# Patient Record
Sex: MALE | Race: OTHER | NOT HISPANIC OR LATINO
[De-identification: names, ages, dates, MRNs, and addresses within clinical notes are randomized per-mention and may not be internally consistent; named-entity substitution may affect disease eponyms.]

---

## 2021-04-22 ENCOUNTER — TRANSCRIPTION ENCOUNTER (OUTPATIENT)
Age: 41
End: 2021-04-22

## 2022-05-01 ENCOUNTER — NON-APPOINTMENT (OUTPATIENT)
Age: 42
End: 2022-05-01

## 2022-11-28 ENCOUNTER — NON-APPOINTMENT (OUTPATIENT)
Age: 42
End: 2022-11-28

## 2023-05-22 ENCOUNTER — NON-APPOINTMENT (OUTPATIENT)
Age: 43
End: 2023-05-22

## 2024-02-25 ENCOUNTER — RESULT CHARGE (OUTPATIENT)
Age: 44
End: 2024-02-25

## 2024-02-25 ENCOUNTER — APPOINTMENT (OUTPATIENT)
Dept: ORTHOPEDIC SURGERY | Facility: CLINIC | Age: 44
End: 2024-02-25
Payer: COMMERCIAL

## 2024-02-25 VITALS — BODY MASS INDEX: 26.6 KG/M2 | HEIGHT: 71 IN | WEIGHT: 190 LBS

## 2024-02-25 DIAGNOSIS — M54.12 RADICULOPATHY, CERVICAL REGION: ICD-10-CM

## 2024-02-25 DIAGNOSIS — M25.512 PAIN IN LEFT SHOULDER: ICD-10-CM

## 2024-02-25 PROBLEM — Z00.00 ENCOUNTER FOR PREVENTIVE HEALTH EXAMINATION: Status: ACTIVE | Noted: 2024-02-25

## 2024-02-25 PROCEDURE — 73030 X-RAY EXAM OF SHOULDER: CPT | Mod: LT

## 2024-02-25 PROCEDURE — 72040 X-RAY EXAM NECK SPINE 2-3 VW: CPT

## 2024-02-25 PROCEDURE — 20610 DRAIN/INJ JOINT/BURSA W/O US: CPT | Mod: LT

## 2024-02-25 PROCEDURE — 99203 OFFICE O/P NEW LOW 30 MIN: CPT

## 2024-02-25 RX ORDER — CYCLOBENZAPRINE HYDROCHLORIDE 10 MG/1
10 TABLET, FILM COATED ORAL
Qty: 30 | Refills: 0 | Status: ACTIVE | COMMUNITY
Start: 2024-02-25 | End: 1900-01-01

## 2024-02-25 RX ORDER — MELOXICAM 15 MG/1
15 TABLET ORAL DAILY
Qty: 30 | Refills: 2 | Status: ACTIVE | COMMUNITY
Start: 2024-02-25 | End: 1900-01-01

## 2024-02-25 NOTE — DISCUSSION/SUMMARY
[de-identified] : Today we discussed treatment options including anti-inflammatories, therapy, and injections.  The risks and benefits of a cortisone injection were explained.  He understands that this cortisone injection would be both diagnostic and treatment.  If it helps him, then we know that there is some pain coming from the shoulder.  If it does not help him at all, then we know that the shoulder pain is referred from the neck.  I did explain to him that this injection will not take away the any numbness or tingling that goes into his fingers.  He would like to proceed with the injection.  Today under sterile technique with the patient's consent, I injected 3 cc of dexamethasone and 2 cc of 1% lidocaine into the glenohumeral joint space of the left shoulder.  Tolerated this well.  The puncture site was covered with a Band-Aid.  He was instructed to ice and rest his shoulder.  I gave him a prescription for meloxicam 15 mg 1 tab once a day for the inflammation.  We discussed the option of an MRI in the future but we will hold off on this because of the injection.  He will follow-up in 4 to 6 weeks for repeat evaluation.  He will contact the office sooner if he has any questions or concerns.  All questions were answered today.

## 2024-02-25 NOTE — HISTORY OF PRESENT ILLNESS
[de-identified] :  patient is a 43-year-old male here for evaluation of his left shoulder and neck.  He has been having pain for the last week since he moved.  He was doing a lot of heavy lifting.  He denies any specific neck pain, however sometimes the pain shoots to his fingers with some numbness and tingling.

## 2024-02-25 NOTE — DATA REVIEWED
[FreeTextEntry1] : 3 x-ray views taken in the office today of his left shoulder show some AC joint arthritis without any acute displaced fractures or bony abnormalities.  2 x-ray views taken in the office today of his neck shows straightening of the normal lordotic curve indicating muscle spasms.

## 2024-02-25 NOTE — PHYSICAL EXAM
[de-identified] :   Physical exam of his neck and left shoulder: Negative midline tenderness of the neck.  Very mild paraspinal muscle tenderness left compared to the right.  Mild trapezius tenderness left compared to the right.  He has positive AC joint tenderness.  No glenohumeral joint tenderness.  Negative subacromial space tenderness.  He is decreased active and passive range of motion secondary to pain.  Negative drop arm.  Positive Speed, positive [place], positive impingement and apprehension.  He has decreased strength in his left compared to the right.  His sensory and motor are intact.

## 2024-03-28 ENCOUNTER — APPOINTMENT (OUTPATIENT)
Dept: ORTHOPEDIC SURGERY | Facility: CLINIC | Age: 44
End: 2024-03-28

## 2024-04-23 ENCOUNTER — NON-APPOINTMENT (OUTPATIENT)
Age: 44
End: 2024-04-23